# Patient Record
Sex: FEMALE | Race: WHITE
[De-identification: names, ages, dates, MRNs, and addresses within clinical notes are randomized per-mention and may not be internally consistent; named-entity substitution may affect disease eponyms.]

---

## 2017-01-31 ENCOUNTER — HOSPITAL ENCOUNTER (OUTPATIENT)
Dept: HOSPITAL 6 - LAB | Age: 22
End: 2017-01-31
Payer: COMMERCIAL

## 2017-01-31 DIAGNOSIS — Z79.899: ICD-10-CM

## 2017-01-31 DIAGNOSIS — Z51.81: Primary | ICD-10-CM

## 2017-02-09 ENCOUNTER — HOSPITAL ENCOUNTER (OUTPATIENT)
Dept: HOSPITAL 6 - LAB | Age: 22
End: 2017-02-09
Payer: COMMERCIAL

## 2017-02-09 DIAGNOSIS — Z51.81: Primary | ICD-10-CM

## 2017-02-09 DIAGNOSIS — Z79.899: ICD-10-CM

## 2017-02-16 ENCOUNTER — HOSPITAL ENCOUNTER (OUTPATIENT)
Dept: HOSPITAL 6 - LAB | Age: 22
End: 2017-02-16
Payer: COMMERCIAL

## 2017-02-16 DIAGNOSIS — Z79.899: Primary | ICD-10-CM

## 2017-10-03 ENCOUNTER — HOSPITAL ENCOUNTER (EMERGENCY)
Dept: HOSPITAL 6 - ED | Age: 22
LOS: 1 days | Discharge: HOME | End: 2017-10-04
Payer: COMMERCIAL

## 2017-10-03 VITALS — HEIGHT: 67.01 IN | WEIGHT: 210.54 LBS | BODY MASS INDEX: 33.04 KG/M2

## 2017-10-03 DIAGNOSIS — J90: Primary | ICD-10-CM

## 2017-10-03 DIAGNOSIS — N63.20: ICD-10-CM

## 2017-10-03 DIAGNOSIS — R00.0: ICD-10-CM

## 2017-10-03 DIAGNOSIS — M32.9: ICD-10-CM

## 2017-10-04 VITALS
DIASTOLIC BLOOD PRESSURE: 66 MMHG | SYSTOLIC BLOOD PRESSURE: 118 MMHG | DIASTOLIC BLOOD PRESSURE: 66 MMHG | DIASTOLIC BLOOD PRESSURE: 66 MMHG | SYSTOLIC BLOOD PRESSURE: 118 MMHG | SYSTOLIC BLOOD PRESSURE: 118 MMHG

## 2017-10-09 ENCOUNTER — HOSPITAL ENCOUNTER (OUTPATIENT)
Dept: HOSPITAL 6 - RAD | Age: 22
End: 2017-10-09
Attending: NURSE PRACTITIONER
Payer: COMMERCIAL

## 2017-10-09 DIAGNOSIS — N63.23: Primary | ICD-10-CM

## 2017-10-18 ENCOUNTER — HOSPITAL ENCOUNTER (OUTPATIENT)
Dept: HOSPITAL 6 - LAB | Age: 22
End: 2017-10-18
Attending: INTERNAL MEDICINE
Payer: COMMERCIAL

## 2017-10-18 DIAGNOSIS — Z79.899: Primary | ICD-10-CM

## 2017-10-18 LAB
ALBUMIN SERPL-MCNC: 3.4 G/DL (ref 3.5–5)
ALT SERPL-CCNC: 41 U/L (ref 9–52)
AST SERPL-CCNC: 20 U/L (ref 14–36)
ERYTHROCYTE [DISTWIDTH] IN BLOOD BY AUTOMATED COUNT: 13.8 % (ref 11.5–14.5)
HCT VFR BLD AUTO: 39.3 % (ref 37–47)
HGB BLD-MCNC: 12.9 G/DL (ref 12.5–16)
MCH RBC QN AUTO: 29 PG (ref 27–31)
MCHC RBC AUTO-ENTMCNC: 33 G/DL (ref 33–37)
MCV RBC AUTO: 89 FL (ref 78–100)
PLATELET # BLD AUTO: 281 K/MM3 (ref 130–400)
PMV BLD AUTO: 10 FL (ref 7.4–10.4)
RBC # BLD AUTO: 4.42 M/MM3 (ref 4.1–5.3)
WBC # BLD AUTO: 11.7 K/MM3 (ref 4.8–10.8)

## 2018-04-23 ENCOUNTER — HOSPITAL ENCOUNTER (EMERGENCY)
Dept: HOSPITAL 6 - ED | Age: 23
Discharge: HOME | End: 2018-04-23
Payer: COMMERCIAL

## 2018-04-23 VITALS — HEIGHT: 67.99 IN | WEIGHT: 244.93 LBS | BODY MASS INDEX: 37.12 KG/M2

## 2018-04-23 VITALS — SYSTOLIC BLOOD PRESSURE: 124 MMHG | DIASTOLIC BLOOD PRESSURE: 74 MMHG

## 2018-04-23 DIAGNOSIS — M32.9: ICD-10-CM

## 2018-04-23 DIAGNOSIS — E03.9: ICD-10-CM

## 2018-04-23 DIAGNOSIS — M94.0: Primary | ICD-10-CM

## 2018-04-23 DIAGNOSIS — Z32.01: ICD-10-CM

## 2018-04-23 LAB
ALBUMIN SERPL-MCNC: 4 G/DL (ref 3.5–5)
ALT SERPL-CCNC: 41 U/L (ref 9–52)
AST SERPL-CCNC: 40 U/L (ref 14–36)
BASOPHILS # BLD: 0 10*3/UL (ref 0.02–0.1)
BILIRUB SERPL-MCNC: 0.3 MG/DL (ref 0.2–1.3)
BUN/CREAT SERPL: 19 (ref 6–26)
CALCIUM SERPL-MCNC: 8.9 MG/DL (ref 8.4–10.2)
CO2 SERPL-SCNC: 26 MMOL/L (ref 22–30)
D DIMER PPP FEU-MCNC: 0.44 MG/L FEU (ref 0.15–0.5)
EOSINOPHIL # BLD: 0 10*3/UL (ref 0.04–0.4)
EOSINOPHIL NFR BLD: 0.7 % (ref 1–5)
ERYTHROCYTE [DISTWIDTH] IN BLOOD BY AUTOMATED COUNT: 14 % (ref 11.5–14.5)
GLUCOSE SERPL-MCNC: 99 MG/DL (ref 65–105)
HCT VFR BLD AUTO: 37.4 % (ref 37–47)
HGB BLD-MCNC: 12.8 G/DL (ref 12.5–16)
LYMPHOCYTES # BLD: 1.2 10*3/UL (ref 1.5–4)
MCH RBC QN AUTO: 29 PG (ref 27–31)
MCHC RBC AUTO-ENTMCNC: 34 G/DL (ref 33–37)
MCV RBC AUTO: 85 FL (ref 78–100)
MONOCYTES # BLD: 0.5 10*3/UL (ref 0.2–0.8)
NEUTROPHILS # BLD: 4.3 10*3/UL (ref 1.4–6.5)
PLATELET # BLD AUTO: 183 K/MM3 (ref 130–400)
PMV BLD AUTO: 11 FL (ref 7.4–10.4)
POTASSIUM SERPL-SCNC: 3.5 MMOL/L (ref 3.6–5)
PROT SERPL-MCNC: 7.6 G/DL (ref 6.3–8.2)
RBC # BLD AUTO: 4.4 M/MM3 (ref 4.1–5.3)
SODIUM SERPL-SCNC: 139 MMOL/L (ref 137–145)
WBC # BLD AUTO: 6.1 K/MM3 (ref 4.8–10.8)

## 2021-06-19 ENCOUNTER — HOSPITAL ENCOUNTER (OUTPATIENT)
Dept: HOSPITAL 6 - LAB | Age: 26
End: 2021-06-19
Attending: FAMILY MEDICINE
Payer: COMMERCIAL

## 2021-06-19 DIAGNOSIS — Z00.00: Primary | ICD-10-CM

## 2021-06-19 LAB
ALBUMIN SERPL-MCNC: 3.9 G/DL (ref 3.5–5)
ALT SERPL-CCNC: 19 U/L (ref 0–55)
AST SERPL-CCNC: 20 U/L (ref 5–34)
BILIRUB SERPL-MCNC: 0.4 MG/DL (ref 0.2–1.2)
CALCIUM SERPL-MCNC: 8.9 MG/DL (ref 8.3–10.5)
CO2 SERPL-SCNC: 19 MMOL/L (ref 22–29)
GLUCOSE SERPL-MCNC: 82 MG/DL (ref 65–105)
POTASSIUM SERPL-SCNC: 3.9 MMOL/L (ref 3.5–5.1)
PROT SERPL-MCNC: 7.2 G/DL (ref 6.4–8.3)
SODIUM SERPL-SCNC: 141 MMOL/L (ref 136–145)

## 2021-07-04 ENCOUNTER — HOSPITAL ENCOUNTER (EMERGENCY)
Dept: HOSPITAL 6 - ED | Age: 26
Discharge: HOME | End: 2021-07-04
Payer: COMMERCIAL

## 2021-07-04 VITALS — DIASTOLIC BLOOD PRESSURE: 68 MMHG | SYSTOLIC BLOOD PRESSURE: 117 MMHG

## 2021-07-04 DIAGNOSIS — Z79.890: ICD-10-CM

## 2021-07-04 DIAGNOSIS — E03.9: ICD-10-CM

## 2021-07-04 DIAGNOSIS — R07.89: Primary | ICD-10-CM

## 2021-07-04 DIAGNOSIS — Z88.2: ICD-10-CM

## 2021-07-04 LAB
BASOPHILS # BLD: 0.02 10*3/UL (ref 0.02–0.1)
EOSINOPHIL # BLD: 0.11 10*3/UL (ref 0.04–0.4)
EOSINOPHIL NFR BLD: 2 % (ref 1–5)
ERYTHROCYTE [DISTWIDTH] IN BLOOD BY AUTOMATED COUNT: 12.9 % (ref 11.5–14.5)
HCT VFR BLD AUTO: 37.1 % (ref 37–47)
HGB BLD-MCNC: 12 G/DL (ref 12.5–16)
LYMPHOCYTES # BLD: 1.5 10*3/UL (ref 1.5–4)
MCH RBC QN AUTO: 30 PG (ref 27–31)
MCHC RBC AUTO-ENTMCNC: 32 G/DL (ref 33–37)
MCV RBC AUTO: 91 FL (ref 78–100)
MONOCYTES # BLD: 0.46 10*3/UL (ref 0.2–0.8)
NEUTROPHILS # BLD: 3.09 10*3/UL (ref 1.4–6.5)
PLATELET # BLD AUTO: 297 K/MM3 (ref 130–400)
PMV BLD AUTO: 9.7 FL (ref 7.4–10.4)
RBC # BLD AUTO: 4.07 M/MM3 (ref 4.1–5.3)
WBC # BLD AUTO: 5.5 K/MM3 (ref 4.8–10.8)

## 2021-07-17 ENCOUNTER — HOSPITAL ENCOUNTER (EMERGENCY)
Dept: HOSPITAL 6 - ED | Age: 26
Discharge: HOME | End: 2021-07-17
Payer: COMMERCIAL

## 2021-07-17 VITALS — SYSTOLIC BLOOD PRESSURE: 99 MMHG | DIASTOLIC BLOOD PRESSURE: 72 MMHG

## 2021-07-17 DIAGNOSIS — E03.9: ICD-10-CM

## 2021-07-17 DIAGNOSIS — Z79.890: ICD-10-CM

## 2021-07-17 DIAGNOSIS — M94.0: Primary | ICD-10-CM

## 2021-07-17 LAB
D DIMER PPP FEU-MCNC: 0.49 MG/L FEU (ref 0.15–0.5)
ERYTHROCYTE [DISTWIDTH] IN BLOOD BY AUTOMATED COUNT: 13.2 % (ref 11.5–14.5)
HCT VFR BLD AUTO: 37.8 % (ref 37–47)
HGB BLD-MCNC: 12.3 G/DL (ref 12.5–16)
LYMPHOCYTES NFR BLD MANUAL: 44 % (ref 20–51)
MCH RBC QN AUTO: 30 PG (ref 27–31)
MCHC RBC AUTO-ENTMCNC: 33 G/DL (ref 33–37)
MCV RBC AUTO: 91 FL (ref 78–100)
MONOCYTES NFR BLD: 8 % (ref 3–10)
NEUTS SEG NFR BLD MANUAL: 45 % (ref 42–75)
OVALOCYTES BLD QL SMEAR: (no result)
PLATELET # BLD AUTO: 180 K/MM3 (ref 130–400)
PMV BLD AUTO: 11.2 FL (ref 7.4–10.4)
RBC # BLD AUTO: 4.14 M/MM3 (ref 4.1–5.3)
WBC # BLD AUTO: 4.1 K/MM3 (ref 4.8–10.8)

## 2021-10-04 ENCOUNTER — HOSPITAL ENCOUNTER (OUTPATIENT)
Dept: HOSPITAL 6 - CARDLAB | Age: 26
End: 2021-10-04
Payer: COMMERCIAL

## 2021-10-04 DIAGNOSIS — R07.89: Primary | ICD-10-CM

## 2022-01-06 ENCOUNTER — HOSPITAL ENCOUNTER (EMERGENCY)
Dept: HOSPITAL 6 - ED | Age: 27
Discharge: HOME | End: 2022-01-06
Payer: COMMERCIAL

## 2022-01-06 VITALS — DIASTOLIC BLOOD PRESSURE: 82 MMHG | SYSTOLIC BLOOD PRESSURE: 104 MMHG

## 2022-01-06 DIAGNOSIS — R05.9: Primary | ICD-10-CM

## 2022-01-08 ENCOUNTER — HOSPITAL ENCOUNTER (EMERGENCY)
Dept: HOSPITAL 6 - ED | Age: 27
Discharge: HOME | End: 2022-01-08
Payer: COMMERCIAL

## 2022-01-08 VITALS — DIASTOLIC BLOOD PRESSURE: 83 MMHG | SYSTOLIC BLOOD PRESSURE: 125 MMHG

## 2022-01-08 DIAGNOSIS — R05.9: Primary | ICD-10-CM

## 2022-01-08 LAB
BASOPHILS # BLD: 0.02 K/MM3 (ref 0.02–0.1)
EOSINOPHIL # BLD: 0.02 K/MM3 (ref 0.04–0.4)
EOSINOPHIL NFR BLD: 0.4 % (ref 1–5)
ERYTHROCYTE [DISTWIDTH] IN BLOOD BY AUTOMATED COUNT: 12.8 % (ref 11.5–14.5)
ERYTHROCYTE [SEDIMENTATION RATE] IN BLOOD: 83 MM/HR (ref 0–20)
HCT VFR BLD AUTO: 40.6 % (ref 37–47)
HGB BLD-MCNC: 14 G/DL (ref 12.5–16)
LYMPHOCYTES # BLD: 0.81 K/MM3 (ref 1.5–4)
MCH RBC QN AUTO: 31 PG (ref 27–31)
MCHC RBC AUTO-ENTMCNC: 35 G/DL (ref 33–37)
MCV RBC AUTO: 89 FL (ref 78–100)
MONOCYTES # BLD: 0.47 K/MM3 (ref 0.2–0.8)
NEUTROPHILS # BLD: 3.9 K/MM3 (ref 1.4–6.5)
PLATELET # BLD AUTO: 226 K/MM3 (ref 130–400)
PMV BLD AUTO: 10.1 FL (ref 7.4–10.4)
RBC # BLD AUTO: 4.58 M/MM3 (ref 4.1–5.3)
WBC # BLD AUTO: 5.2 K/MM3 (ref 4.8–10.8)

## 2022-01-28 ENCOUNTER — HOSPITAL ENCOUNTER (EMERGENCY)
Dept: HOSPITAL 6 - ED | Age: 27
Discharge: HOME | End: 2022-01-28
Payer: COMMERCIAL

## 2022-01-28 VITALS — BODY MASS INDEX: 36.28 KG/M2 | HEIGHT: 69.02 IN | WEIGHT: 244.93 LBS

## 2022-01-28 VITALS — DIASTOLIC BLOOD PRESSURE: 76 MMHG | SYSTOLIC BLOOD PRESSURE: 122 MMHG

## 2022-01-28 DIAGNOSIS — Z20.822: ICD-10-CM

## 2022-01-28 DIAGNOSIS — M54.2: ICD-10-CM

## 2022-01-28 DIAGNOSIS — Z79.890: ICD-10-CM

## 2022-01-28 DIAGNOSIS — E03.9: ICD-10-CM

## 2022-01-28 DIAGNOSIS — Z98.890: ICD-10-CM

## 2022-01-28 DIAGNOSIS — G43.909: Primary | ICD-10-CM

## 2022-01-28 DIAGNOSIS — M32.9: ICD-10-CM

## 2022-01-28 LAB
ALBUMIN SERPL-MCNC: 4.3 G/DL (ref 3.5–5)
ALT SERPL-CCNC: 123 U/L (ref 0–55)
APPEARANCE UR: CLEAR
AST SERPL-CCNC: 18 U/L (ref 5–34)
BASOPHILS # BLD: 0.02 K/MM3 (ref 0.02–0.1)
BILIRUB SERPL-MCNC: 0.5 MG/DL (ref 0.2–1.2)
BILIRUB UR QL STRIP.AUTO: NEGATIVE
CALCIUM SERPL-MCNC: 9.7 MG/DL (ref 8.3–10.5)
CO2 SERPL-SCNC: 25 MMOL/L (ref 22–29)
COLOR UR AUTO: YELLOW
EOSINOPHIL # BLD: 0.06 K/MM3 (ref 0.04–0.4)
EOSINOPHIL NFR BLD: 0.9 % (ref 1–5)
ERYTHROCYTE [DISTWIDTH] IN BLOOD BY AUTOMATED COUNT: 13.4 % (ref 11.5–14.5)
GLUCOSE SERPL-MCNC: 80 MG/DL (ref 65–105)
GLUCOSE UR QL STRIP.AUTO: NEGATIVE
HCT VFR BLD AUTO: 38.8 % (ref 37–47)
HGB BLD-MCNC: 12.9 G/DL (ref 12.5–16)
KETONES UR QL STRIP.AUTO: NEGATIVE
LEUKOCYTE ESTERASE UR QL STRIP: NEGATIVE
LYMPHOCYTES # BLD: 1.19 K/MM3 (ref 1.5–4)
MCH RBC QN AUTO: 30 PG (ref 27–31)
MCHC RBC AUTO-ENTMCNC: 33 G/DL (ref 33–37)
MCV RBC AUTO: 91 FL (ref 78–100)
MONOCYTES # BLD: 0.53 K/MM3 (ref 0.2–0.8)
MUCOUS THREADS URNS QL MICRO: PRESENT
NEUTROPHILS # BLD: 5.05 K/MM3 (ref 1.4–6.5)
NITRITE UR QL STRIP: NEGATIVE
PH UR STRIP.AUTO: 6 [PH] (ref 5–8)
PLATELET # BLD AUTO: 213 K/MM3 (ref 130–400)
PMV BLD AUTO: 10.1 FL (ref 7.4–10.4)
POTASSIUM SERPL-SCNC: 4 MMOL/L (ref 3.5–5.1)
PROT ?TM UR-MCNC: (no result) MG/DL
PROT SERPL-MCNC: 7.4 G/DL (ref 6.4–8.3)
RBC # BLD AUTO: 4.25 M/MM3 (ref 4.1–5.3)
RBC UR QL AUTO: NEGATIVE
SODIUM SERPL-SCNC: 139 MMOL/L (ref 136–145)
SP GR UR STRIP.AUTO: 1.02 (ref 1–1.03)
UROBILINOGEN UR-MCNC: NORMAL MG/DL
WBC # BLD AUTO: 7 K/MM3 (ref 4.8–10.8)
WBC #/AREA URNS HPF: 0 /HPF (ref 0–3)

## 2022-05-09 ENCOUNTER — HOSPITAL ENCOUNTER (OUTPATIENT)
Dept: HOSPITAL 19 - ZCOL.LAB | Age: 27
End: 2022-05-09
Attending: PHYSICIAN ASSISTANT
Payer: COMMERCIAL

## 2022-05-09 DIAGNOSIS — J32.4: Primary | ICD-10-CM

## 2022-06-03 ENCOUNTER — HOSPITAL ENCOUNTER (EMERGENCY)
Dept: HOSPITAL 19 - COL.ER | Age: 27
LOS: 1 days | Discharge: HOME | End: 2022-06-04
Attending: EMERGENCY MEDICINE
Payer: COMMERCIAL

## 2022-06-03 VITALS — BODY MASS INDEX: 38.55 KG/M2 | HEIGHT: 67 IN | WEIGHT: 245.59 LBS

## 2022-06-03 VITALS — TEMPERATURE: 98.5 F

## 2022-06-03 DIAGNOSIS — Z28.310: ICD-10-CM

## 2022-06-03 DIAGNOSIS — Z87.19: ICD-10-CM

## 2022-06-03 DIAGNOSIS — D72.819: ICD-10-CM

## 2022-06-03 DIAGNOSIS — K80.50: Primary | ICD-10-CM

## 2022-06-03 PROCEDURE — C9113 INJ PANTOPRAZOLE SODIUM, VIA: HCPCS

## 2022-06-04 VITALS — SYSTOLIC BLOOD PRESSURE: 93 MMHG | DIASTOLIC BLOOD PRESSURE: 64 MMHG | HEART RATE: 74 BPM

## 2022-06-04 LAB
ALBUMIN SERPL-MCNC: 3.4 GM/DL (ref 3.5–5)
ALP SERPL-CCNC: 100 U/L (ref 40–150)
ALT SERPL-CCNC: 19 U/L (ref 0–55)
ANION GAP SERPL CALC-SCNC: 14 MMOL/L (ref 7–16)
AST SERPL-CCNC: 49 U/L (ref 5–34)
BILIRUB SERPL-MCNC: 0.9 MG/DL (ref 0.2–1.2)
BUN SERPL-MCNC: 10 MG/DL (ref 7–19)
CALCIUM SERPL-MCNC: 8.7 MG/DL (ref 8.4–10.2)
CHLORIDE SERPL-SCNC: 106 MMOL/L (ref 98–107)
CO2 SERPL-SCNC: 22 MMOL/L (ref 22–29)
CREAT SERPL-SCNC: 0.89 MG/DL (ref 0.57–1.11)
ERYTHROCYTE [DISTWIDTH] IN BLOOD BY AUTOMATED COUNT: 14.9 % (ref 11.5–14.5)
GLUCOSE SERPL-MCNC: 83 MG/DL (ref 70–99)
HCG SERPL QL: NEGATIVE
HCT VFR BLD AUTO: 30.1 % (ref 37–47)
HGB BLD-MCNC: 10 G/DL (ref 12.5–16)
LIPASE SERPL-CCNC: 64 U/L (ref 8–78)
LYMPHOCYTES NFR BLD MANUAL: 6 % (ref 20–51)
MCH RBC QN AUTO: 29 PG (ref 27–31)
MCHC RBC AUTO-ENTMCNC: 33 G/DL (ref 33–37)
MCV RBC AUTO: 87 FL (ref 80–100)
METAMYELOCYTES NFR BLD MANUAL: 1 % (ref 0–0)
MONOCYTES NFR BLD: 7 % (ref 1.7–9.3)
NEUTS BAND NFR BLD: 10 % (ref 0–10)
NEUTS SEG NFR BLD MANUAL: 76 % (ref 42–75.2)
PLATELET # BLD AUTO: 83 K/MM3 (ref 130–400)
PLATELET BLD QL SMEAR: NORMAL
PMV BLD AUTO: 10.2 FL (ref 7.4–10.4)
POTASSIUM SERPL-SCNC: 3.5 MMOL/L (ref 3.5–4.5)
PROT SERPL-MCNC: 6.4 GM/DL (ref 6.2–8.1)
RBC # BLD AUTO: 3.47 M/MM3 (ref 4.1–5.3)
SODIUM SERPL-SCNC: 142 MMOL/L (ref 136–145)

## 2022-06-14 ENCOUNTER — HOSPITAL ENCOUNTER (OUTPATIENT)
Dept: HOSPITAL 6 - LAB | Age: 27
End: 2022-06-14
Attending: FAMILY MEDICINE
Payer: COMMERCIAL

## 2022-06-14 DIAGNOSIS — Z13.220: ICD-10-CM

## 2022-06-14 DIAGNOSIS — F41.8: ICD-10-CM

## 2022-06-14 DIAGNOSIS — Z00.00: Primary | ICD-10-CM

## 2022-06-14 DIAGNOSIS — Z13.1: ICD-10-CM

## 2022-06-14 LAB
ALBUMIN SERPL-MCNC: 3.9 G/DL (ref 3.5–5)
ALT SERPL-CCNC: 10 U/L (ref 0–55)
AST SERPL-CCNC: 13 U/L (ref 5–34)
BILIRUB SERPL-MCNC: 0.5 MG/DL (ref 0.2–1.2)
CALCIUM SERPL-MCNC: 9.4 MG/DL (ref 8.3–10.5)
CO2 SERPL-SCNC: 20 MMOL/L (ref 22–29)
GLUCOSE SERPL-MCNC: 95 MG/DL (ref 65–105)
POTASSIUM SERPL-SCNC: 3.6 MMOL/L (ref 3.5–5.1)
PROT SERPL-MCNC: 6.7 G/DL (ref 6.4–8.3)
SODIUM SERPL-SCNC: 140 MMOL/L (ref 136–145)

## 2022-06-28 ENCOUNTER — HOSPITAL ENCOUNTER (OUTPATIENT)
Dept: HOSPITAL 6 - RAD | Age: 27
End: 2022-06-28
Payer: COMMERCIAL

## 2022-06-28 DIAGNOSIS — K80.20: Primary | ICD-10-CM

## 2022-07-19 ENCOUNTER — HOSPITAL ENCOUNTER (OUTPATIENT)
Dept: HOSPITAL 19 - SDCO | Age: 27
Discharge: HOME | End: 2022-07-19
Attending: SURGERY
Payer: COMMERCIAL

## 2022-07-19 VITALS — BODY MASS INDEX: 37.96 KG/M2 | WEIGHT: 241.85 LBS | HEIGHT: 67 IN

## 2022-07-19 VITALS — DIASTOLIC BLOOD PRESSURE: 63 MMHG | HEART RATE: 75 BPM | SYSTOLIC BLOOD PRESSURE: 117 MMHG

## 2022-07-19 VITALS — SYSTOLIC BLOOD PRESSURE: 101 MMHG | DIASTOLIC BLOOD PRESSURE: 63 MMHG | HEART RATE: 71 BPM

## 2022-07-19 VITALS — SYSTOLIC BLOOD PRESSURE: 124 MMHG | HEART RATE: 64 BPM | DIASTOLIC BLOOD PRESSURE: 65 MMHG

## 2022-07-19 VITALS — DIASTOLIC BLOOD PRESSURE: 60 MMHG | SYSTOLIC BLOOD PRESSURE: 108 MMHG | HEART RATE: 70 BPM | TEMPERATURE: 97.9 F

## 2022-07-19 VITALS — DIASTOLIC BLOOD PRESSURE: 59 MMHG | HEART RATE: 66 BPM | SYSTOLIC BLOOD PRESSURE: 116 MMHG

## 2022-07-19 VITALS — SYSTOLIC BLOOD PRESSURE: 119 MMHG | HEART RATE: 67 BPM | DIASTOLIC BLOOD PRESSURE: 60 MMHG

## 2022-07-19 VITALS — TEMPERATURE: 98.2 F

## 2022-07-19 DIAGNOSIS — Z79.52: ICD-10-CM

## 2022-07-19 DIAGNOSIS — Z79.899: ICD-10-CM

## 2022-07-19 DIAGNOSIS — C71.9: ICD-10-CM

## 2022-07-19 DIAGNOSIS — Z28.9: ICD-10-CM

## 2022-07-19 DIAGNOSIS — Z79.51: ICD-10-CM

## 2022-07-19 DIAGNOSIS — Z28.310: ICD-10-CM

## 2022-07-19 DIAGNOSIS — K80.10: Primary | ICD-10-CM

## 2022-07-19 NOTE — NUR
1600: Patient arrived back into [facility] from PACU. Patient is alert and
oriented. Vital signs stable. Report received from Jana Yarbrough Patient
requesting apple juice and jello. Patient rating pain 3-4/10 to back and
epigastric region. Denies nausea. Call light left within reach.
 
1615: Patient vitally stable. Tolerating food and drink well. Rating pain 5/10
to incisional areas, PRN pain medication given per MAR.
 
1645: Patient up to restroom with stand by assist. States pain is 1/10 to
incisional area. Denies nausea. Patient states she would like to rest.
 
1730: Patient continues to do well. Denies nausea. Rating pain 1-2/10.
 
1755:  Patient meets discharge criteria. IV removed without complications.
Went through discharge instructions with patient and . Patient got
dressed. Escorted to emergency department entrance via wheelchair. Patient got
into personal vehicle unassisted and left in the care of her .

## 2022-07-24 ENCOUNTER — HOSPITAL ENCOUNTER (INPATIENT)
Dept: HOSPITAL 19 - COL.ER | Age: 27
LOS: 5 days | Discharge: HOME | DRG: 392 | End: 2022-07-29
Attending: SURGERY | Admitting: SURGERY
Payer: COMMERCIAL

## 2022-07-24 VITALS — HEART RATE: 41 BPM | SYSTOLIC BLOOD PRESSURE: 129 MMHG | TEMPERATURE: 97.8 F | DIASTOLIC BLOOD PRESSURE: 79 MMHG

## 2022-07-24 VITALS — TEMPERATURE: 97.9 F | HEART RATE: 42 BPM | DIASTOLIC BLOOD PRESSURE: 66 MMHG | SYSTOLIC BLOOD PRESSURE: 109 MMHG

## 2022-07-24 VITALS — BODY MASS INDEX: 37.75 KG/M2 | HEIGHT: 67.01 IN | WEIGHT: 240.52 LBS

## 2022-07-24 VITALS — TEMPERATURE: 98.4 F | HEART RATE: 55 BPM | DIASTOLIC BLOOD PRESSURE: 67 MMHG | SYSTOLIC BLOOD PRESSURE: 123 MMHG

## 2022-07-24 VITALS — TEMPERATURE: 98.4 F | SYSTOLIC BLOOD PRESSURE: 123 MMHG | HEART RATE: 55 BPM | DIASTOLIC BLOOD PRESSURE: 67 MMHG

## 2022-07-24 DIAGNOSIS — E03.9: ICD-10-CM

## 2022-07-24 DIAGNOSIS — F32.A: ICD-10-CM

## 2022-07-24 DIAGNOSIS — K21.9: ICD-10-CM

## 2022-07-24 DIAGNOSIS — F41.9: ICD-10-CM

## 2022-07-24 DIAGNOSIS — M79.7: ICD-10-CM

## 2022-07-24 DIAGNOSIS — Z88.2: ICD-10-CM

## 2022-07-24 DIAGNOSIS — Z79.52: ICD-10-CM

## 2022-07-24 DIAGNOSIS — Z79.890: ICD-10-CM

## 2022-07-24 DIAGNOSIS — C71.9: ICD-10-CM

## 2022-07-24 DIAGNOSIS — K59.00: ICD-10-CM

## 2022-07-24 DIAGNOSIS — R10.13: Primary | ICD-10-CM

## 2022-07-24 DIAGNOSIS — D70.9: ICD-10-CM

## 2022-07-24 DIAGNOSIS — M32.9: ICD-10-CM

## 2022-07-24 DIAGNOSIS — Z23: ICD-10-CM

## 2022-07-24 DIAGNOSIS — Z90.49: ICD-10-CM

## 2022-07-24 DIAGNOSIS — D61.818: ICD-10-CM

## 2022-07-24 DIAGNOSIS — G89.29: ICD-10-CM

## 2022-07-24 DIAGNOSIS — J91.8: ICD-10-CM

## 2022-07-24 LAB
ALBUMIN SERPL-MCNC: 3.6 GM/DL (ref 3.5–5)
ALP SERPL-CCNC: 162 U/L (ref 40–150)
ALT SERPL-CCNC: 258 U/L (ref 0–55)
ANION GAP SERPL CALC-SCNC: 14 MMOL/L (ref 7–16)
AST SERPL-CCNC: 505 U/L (ref 5–34)
BASOPHILS # BLD: 0 K/MM3 (ref 0–0.2)
BASOPHILS NFR BLD AUTO: 0.3 % (ref 0–2)
BILIRUB DIRECT SERPL-MCNC: 1 MG/DL (ref 0–0.5)
BILIRUB SERPL-MCNC: 1.5 MG/DL (ref 0.2–1.2)
BUN SERPL-MCNC: 18 MG/DL (ref 7–19)
CALCIUM SERPL-MCNC: 9.1 MG/DL (ref 8.4–10.2)
CHLORIDE SERPL-SCNC: 108 MMOL/L (ref 98–107)
CO2 SERPL-SCNC: 22 MMOL/L (ref 22–29)
CREAT SERPL-SCNC: 0.71 MG/DL (ref 0.57–1.11)
EOSINOPHIL # BLD: 0 K/MM3 (ref 0–0.7)
EOSINOPHIL NFR BLD: 0.6 % (ref 0–4)
ERYTHROCYTE [DISTWIDTH] IN BLOOD BY AUTOMATED COUNT: 14.3 % (ref 11.5–14.5)
GLUCOSE SERPL-MCNC: 90 MG/DL (ref 70–99)
GRANULOCYTES # BLD AUTO: 85.8 % (ref 42.2–75.2)
HCG SERPL QL: NEGATIVE
HCT VFR BLD AUTO: 31.3 % (ref 37–47)
HGB BLD-MCNC: 10.3 G/DL (ref 12.5–16)
LIPASE SERPL-CCNC: 37 U/L (ref 8–78)
LYMPHOCYTES # BLD: 0.1 K/MM3 (ref 1.2–3.4)
LYMPHOCYTES NFR BLD: 3.1 % (ref 20–51)
MCH RBC QN AUTO: 29 PG (ref 27–31)
MCHC RBC AUTO-ENTMCNC: 33 G/DL (ref 33–37)
MCV RBC AUTO: 87 FL (ref 80–100)
MONOCYTES # BLD: 0.3 K/MM3 (ref 0.1–0.6)
MONOCYTES NFR BLD AUTO: 9 % (ref 1.7–9.3)
NEUTROPHILS # BLD: 2.8 K/MM3 (ref 1.4–6.5)
PLATELET # BLD AUTO: 137 K/MM3 (ref 130–400)
PMV BLD AUTO: 10.3 FL (ref 7.4–10.4)
POTASSIUM SERPL-SCNC: 3.6 MMOL/L (ref 3.5–4.5)
PROT SERPL-MCNC: 6.7 GM/DL (ref 6.2–8.1)
RBC # BLD AUTO: 3.58 M/MM3 (ref 4.1–5.3)
SODIUM SERPL-SCNC: 144 MMOL/L (ref 136–145)

## 2022-07-24 PROCEDURE — C9113 INJ PANTOPRAZOLE SODIUM, VIA: HCPCS

## 2022-07-24 NOTE — NUR
PT UP TO FLOOR FROM ER AT 1745. PT STATES THAT SHE IS NOT HAVING ANY ABD PAIN
AT THIS TIME. "I WAS EARLIER BUT I FEEL OK NOW." PT STATES THAT SHE IS ON
CHEMO BUT HAS NOT HAD IT FOR ABOUT 2 MONTHS NOW. PT STATES THAT SHE WOULD LIKE
TO HAVE SOME WATER. ITEMS WERE GIVEN TO PT. PT STATES NO OTHER COCNERNS/NEEDS
AT THIS TIME. CALL LIGHT IS WITHIN REACH.

## 2022-07-25 VITALS — SYSTOLIC BLOOD PRESSURE: 117 MMHG | TEMPERATURE: 97.9 F | DIASTOLIC BLOOD PRESSURE: 72 MMHG | HEART RATE: 64 BPM

## 2022-07-25 VITALS — HEART RATE: 46 BPM | SYSTOLIC BLOOD PRESSURE: 121 MMHG | TEMPERATURE: 97.8 F | DIASTOLIC BLOOD PRESSURE: 80 MMHG

## 2022-07-25 VITALS — TEMPERATURE: 98.1 F | HEART RATE: 66 BPM | SYSTOLIC BLOOD PRESSURE: 115 MMHG | DIASTOLIC BLOOD PRESSURE: 73 MMHG

## 2022-07-25 VITALS — DIASTOLIC BLOOD PRESSURE: 78 MMHG | SYSTOLIC BLOOD PRESSURE: 120 MMHG | TEMPERATURE: 98.2 F | HEART RATE: 56 BPM

## 2022-07-25 VITALS — HEART RATE: 49 BPM | DIASTOLIC BLOOD PRESSURE: 61 MMHG | TEMPERATURE: 97.9 F | SYSTOLIC BLOOD PRESSURE: 110 MMHG

## 2022-07-25 VITALS — HEART RATE: 52 BPM | TEMPERATURE: 98 F | DIASTOLIC BLOOD PRESSURE: 58 MMHG | SYSTOLIC BLOOD PRESSURE: 100 MMHG

## 2022-07-25 LAB
ALBUMIN SERPL-MCNC: 3.1 GM/DL (ref 3.5–5)
ALP SERPL-CCNC: 162 U/L (ref 40–150)
ALT SERPL-CCNC: 260 U/L (ref 0–55)
ANION GAP SERPL CALC-SCNC: 11 MMOL/L (ref 7–16)
ANISOCYTOSIS BLD QL: (no result)
AST SERPL-CCNC: 275 U/L (ref 5–34)
BILIRUB SERPL-MCNC: 1.7 MG/DL (ref 0.2–1.2)
BUN SERPL-MCNC: 10 MG/DL (ref 7–19)
CALCIUM SERPL-MCNC: 8.6 MG/DL (ref 8.4–10.2)
CHLORIDE SERPL-SCNC: 109 MMOL/L (ref 98–107)
CO2 SERPL-SCNC: 22 MMOL/L (ref 22–29)
CREAT SERPL-SCNC: 0.68 MG/DL (ref 0.57–1.11)
EOSINOPHIL NFR BLD: 2 % (ref 0–4)
ERYTHROCYTE [DISTWIDTH] IN BLOOD BY AUTOMATED COUNT: 14.3 % (ref 11.5–14.5)
GLUCOSE SERPL-MCNC: 85 MG/DL (ref 70–99)
HCT VFR BLD AUTO: 28.5 % (ref 37–47)
HGB BLD-MCNC: 9.2 G/DL (ref 12.5–16)
HYPOCHROMIA BLD QL SMEAR: (no result)
LIPASE SERPL-CCNC: 22 U/L (ref 8–78)
LYMPHOCYTES NFR BLD MANUAL: 8 % (ref 20–51)
MCH RBC QN AUTO: 29 PG (ref 27–31)
MCHC RBC AUTO-ENTMCNC: 32 G/DL (ref 33–37)
MCV RBC AUTO: 90 FL (ref 80–100)
MONOCYTES NFR BLD: 6 % (ref 1.7–9.3)
NEUTS BAND NFR BLD: 4 % (ref 0–10)
NEUTS SEG NFR BLD MANUAL: 80 % (ref 42–75.2)
PLATELET # BLD AUTO: 93 K/MM3 (ref 130–400)
PLATELET BLD QL SMEAR: (no result)
PMV BLD AUTO: 10.9 FL (ref 7.4–10.4)
POTASSIUM SERPL-SCNC: 4 MMOL/L (ref 3.5–4.5)
PROT SERPL-MCNC: 5.8 GM/DL (ref 6.2–8.1)
RBC # BLD AUTO: 3.18 M/MM3 (ref 4.1–5.3)
SODIUM SERPL-SCNC: 142 MMOL/L (ref 136–145)

## 2022-07-25 NOTE — NUR
Initial visit; Patient was receptive to prayer for her ill health. 
prayed that she be healed of her many illnesses and will keep her in
's prayers.

## 2022-07-25 NOTE — NUR
Pt had uneventful night. Some nausea, Zofran given per MAR. Pt's IV in the
left hand was painful and swollen at insertion site. Started new IV in the
left forearm, 20g. Patient denies any pain from that site. Fluids running. Pt
assesment completed. No other concerns. Report given to SELIN Odom.

## 2022-07-26 VITALS — SYSTOLIC BLOOD PRESSURE: 103 MMHG | TEMPERATURE: 98.1 F | DIASTOLIC BLOOD PRESSURE: 51 MMHG | HEART RATE: 60 BPM

## 2022-07-26 VITALS — SYSTOLIC BLOOD PRESSURE: 112 MMHG | HEART RATE: 68 BPM | DIASTOLIC BLOOD PRESSURE: 62 MMHG | TEMPERATURE: 98.2 F

## 2022-07-26 VITALS — DIASTOLIC BLOOD PRESSURE: 85 MMHG | HEART RATE: 58 BPM | SYSTOLIC BLOOD PRESSURE: 117 MMHG | TEMPERATURE: 97.8 F

## 2022-07-26 VITALS — HEART RATE: 67 BPM | TEMPERATURE: 97.6 F | DIASTOLIC BLOOD PRESSURE: 61 MMHG | SYSTOLIC BLOOD PRESSURE: 111 MMHG

## 2022-07-26 VITALS — SYSTOLIC BLOOD PRESSURE: 110 MMHG | TEMPERATURE: 97 F | DIASTOLIC BLOOD PRESSURE: 56 MMHG | HEART RATE: 56 BPM

## 2022-07-26 LAB
ALBUMIN SERPL-MCNC: 3.1 GM/DL (ref 3.5–5)
ALP SERPL-CCNC: 170 U/L (ref 40–150)
ALT SERPL-CCNC: 225 U/L (ref 0–55)
ANION GAP SERPL CALC-SCNC: 12 MMOL/L (ref 7–16)
AST SERPL-CCNC: 185 U/L (ref 5–34)
BASOPHILS # BLD: 0 K/MM3 (ref 0–0.2)
BASOPHILS NFR BLD AUTO: 0.3 % (ref 0–2)
BILIRUB SERPL-MCNC: 1.5 MG/DL (ref 0.2–1.2)
BUN SERPL-MCNC: 7 MG/DL (ref 7–19)
CALCIUM SERPL-MCNC: 8.9 MG/DL (ref 8.4–10.2)
CHLORIDE SERPL-SCNC: 109 MMOL/L (ref 98–107)
CO2 SERPL-SCNC: 23 MMOL/L (ref 22–29)
CREAT SERPL-SCNC: 0.74 MG/DL (ref 0.57–1.11)
EOSINOPHIL # BLD: 0 K/MM3 (ref 0–0.7)
EOSINOPHIL NFR BLD: 1 % (ref 0–4)
ERYTHROCYTE [DISTWIDTH] IN BLOOD BY AUTOMATED COUNT: 14.5 % (ref 11.5–14.5)
GLUCOSE SERPL-MCNC: 92 MG/DL (ref 70–99)
GRANULOCYTES # BLD AUTO: 80.3 % (ref 42.2–75.2)
HCT VFR BLD AUTO: 28.9 % (ref 37–47)
HGB BLD-MCNC: 9.2 G/DL (ref 12.5–16)
LYMPHOCYTES # BLD: 0.2 K/MM3 (ref 1.2–3.4)
LYMPHOCYTES NFR BLD: 6.5 % (ref 20–51)
MCH RBC QN AUTO: 29 PG (ref 27–31)
MCHC RBC AUTO-ENTMCNC: 32 G/DL (ref 33–37)
MCV RBC AUTO: 91 FL (ref 80–100)
MONOCYTES # BLD: 0.3 K/MM3 (ref 0.1–0.6)
MONOCYTES NFR BLD AUTO: 10.5 % (ref 1.7–9.3)
NEUTROPHILS # BLD: 2.4 K/MM3 (ref 1.4–6.5)
PLATELET # BLD AUTO: 126 K/MM3 (ref 130–400)
PMV BLD AUTO: 11 FL (ref 7.4–10.4)
POTASSIUM SERPL-SCNC: 3.8 MMOL/L (ref 3.5–4.5)
PROT SERPL-MCNC: 5.8 GM/DL (ref 6.2–8.1)
RBC # BLD AUTO: 3.17 M/MM3 (ref 4.1–5.3)
SODIUM SERPL-SCNC: 144 MMOL/L (ref 136–145)

## 2022-07-26 NOTE — NUR
THIS PATIENT HAD A DIFFICULT NIGHT WITH PAIN AND NAUSEA. THE PATIENT DID
RECEIVE TWO DOSES OF DILAUDID BACK TO BACK PER THE MAR PROTOCOL. THE PATIENT
IS COMPLAINING OF PAIN DIRECTLY BELOW THE XYPHOID PROCESS. THE ABD CT SCAN
DOES SHOW SOME TRACE BILATERAL PLEURAL EFFUSIONS AS WELL AS A MASS IN THE LEFT
BREAST THAT WAS DISCUSSED WITH BJ SHARIF THIS MORNING. REPORT GIVEN TO
SELIN VELEZ. NO OTHER CONCERNS.

## 2022-07-26 NOTE — NUR
met with patient to discuss discharge planning. Patient lives in
[place] with her , Theo (ph#[phone]) and their two sons, ages 3
and 1. Patient sees Dr. Roque for primary care and follows at [facility] for
oncology. Patient had surgery for brain cancer in January and has had
chemotherapy. Patient states she was just recently cleared to drive again as
she could not drive after her brain surgery. Patient is employed by [employer] and is independent with [employer]. Patient does not use any DME.
Patient was interested in completing [facility]. Patient chose to designate her
, Teho and her mother, Ashley (ph#[phone]). SW provided original
and copies to patient then placed copy in patient's chart. Patient plans to
return home at time of discharge.
 
Discharge Plan: Home

## 2022-07-27 VITALS — SYSTOLIC BLOOD PRESSURE: 106 MMHG | HEART RATE: 66 BPM | DIASTOLIC BLOOD PRESSURE: 59 MMHG | TEMPERATURE: 98.2 F

## 2022-07-27 VITALS — HEART RATE: 62 BPM | TEMPERATURE: 98.4 F | SYSTOLIC BLOOD PRESSURE: 110 MMHG | DIASTOLIC BLOOD PRESSURE: 60 MMHG

## 2022-07-27 VITALS — SYSTOLIC BLOOD PRESSURE: 109 MMHG | DIASTOLIC BLOOD PRESSURE: 53 MMHG | HEART RATE: 59 BPM

## 2022-07-27 VITALS — HEART RATE: 62 BPM | TEMPERATURE: 98.1 F | DIASTOLIC BLOOD PRESSURE: 60 MMHG | SYSTOLIC BLOOD PRESSURE: 110 MMHG

## 2022-07-27 VITALS — TEMPERATURE: 98.2 F | DIASTOLIC BLOOD PRESSURE: 64 MMHG | SYSTOLIC BLOOD PRESSURE: 124 MMHG | HEART RATE: 69 BPM

## 2022-07-27 LAB
ANION GAP SERPL CALC-SCNC: 10 MMOL/L (ref 7–16)
BUN SERPL-MCNC: 6 MG/DL (ref 7–19)
CALCIUM SERPL-MCNC: 9.1 MG/DL (ref 8.4–10.2)
CHLORIDE SERPL-SCNC: 106 MMOL/L (ref 98–107)
CO2 SERPL-SCNC: 23 MMOL/L (ref 22–29)
CREAT SERPL-SCNC: 0.79 MG/DL (ref 0.57–1.11)
EOSINOPHIL NFR BLD: 2 % (ref 0–4)
ERYTHROCYTE [DISTWIDTH] IN BLOOD BY AUTOMATED COUNT: 14.9 % (ref 11.5–14.5)
GLUCOSE SERPL-MCNC: 85 MG/DL (ref 70–99)
HCT VFR BLD AUTO: 31.7 % (ref 37–47)
HGB BLD-MCNC: 10.2 G/DL (ref 12.5–16)
LYMPHOCYTES NFR BLD MANUAL: 10 % (ref 20–51)
MCH RBC QN AUTO: 29 PG (ref 27–31)
MCHC RBC AUTO-ENTMCNC: 32 G/DL (ref 33–37)
MCV RBC AUTO: 90 FL (ref 80–100)
METAMYELOCYTES NFR BLD MANUAL: 1 % (ref 0–0)
MONOCYTES NFR BLD: 5 % (ref 1.7–9.3)
NEUTS BAND NFR BLD: 4 % (ref 0–10)
NEUTS SEG NFR BLD MANUAL: 78 % (ref 42–75.2)
PLATELET # BLD AUTO: 100 K/MM3 (ref 130–400)
PLATELET BLD QL SMEAR: (no result)
PMV BLD AUTO: 11.5 FL (ref 7.4–10.4)
POTASSIUM SERPL-SCNC: 3.6 MMOL/L (ref 3.5–4.5)
RBC # BLD AUTO: 3.51 M/MM3 (ref 4.1–5.3)
SODIUM SERPL-SCNC: 139 MMOL/L (ref 136–145)

## 2022-07-27 PROCEDURE — 0DB88ZX EXCISION OF SMALL INTESTINE, VIA NATURAL OR ARTIFICIAL OPENING ENDOSCOPIC, DIAGNOSTIC: ICD-10-PCS | Performed by: INTERNAL MEDICINE

## 2022-07-27 PROCEDURE — 0DB68ZX EXCISION OF STOMACH, VIA NATURAL OR ARTIFICIAL OPENING ENDOSCOPIC, DIAGNOSTIC: ICD-10-PCS | Performed by: INTERNAL MEDICINE

## 2022-07-27 PROCEDURE — 0DB98ZX EXCISION OF DUODENUM, VIA NATURAL OR ARTIFICIAL OPENING ENDOSCOPIC, DIAGNOSTIC: ICD-10-PCS | Performed by: INTERNAL MEDICINE

## 2022-07-27 NOTE — NUR
PT RESTING IN BED AT THIS TIME. MORNING MEDICATIONS GIVEN. SHIFT ASSESSMENT
COMPLETED. PT TOLERATING CLEAR LIQUIDS AFTER NAUSEOUS EPISODE THIS AM. WILL BE
NPO AT 1100 FOR EGD THIS AFTERNOON. PT REPORTING EPIGASTRIC PAIN. DENIES ANY
NEEDS. WILL CONTINUE TO MONITOR.

## 2022-07-28 VITALS — HEART RATE: 68 BPM | TEMPERATURE: 99 F | SYSTOLIC BLOOD PRESSURE: 112 MMHG | DIASTOLIC BLOOD PRESSURE: 51 MMHG

## 2022-07-28 VITALS — HEART RATE: 74 BPM | DIASTOLIC BLOOD PRESSURE: 34 MMHG | TEMPERATURE: 97.5 F | SYSTOLIC BLOOD PRESSURE: 93 MMHG

## 2022-07-28 VITALS — TEMPERATURE: 98 F | DIASTOLIC BLOOD PRESSURE: 66 MMHG | SYSTOLIC BLOOD PRESSURE: 118 MMHG | HEART RATE: 83 BPM

## 2022-07-28 VITALS — HEART RATE: 73 BPM | DIASTOLIC BLOOD PRESSURE: 60 MMHG | SYSTOLIC BLOOD PRESSURE: 113 MMHG | TEMPERATURE: 98.5 F

## 2022-07-28 VITALS — TEMPERATURE: 97.9 F | SYSTOLIC BLOOD PRESSURE: 112 MMHG | HEART RATE: 84 BPM | DIASTOLIC BLOOD PRESSURE: 56 MMHG

## 2022-07-28 VITALS — SYSTOLIC BLOOD PRESSURE: 110 MMHG | HEART RATE: 65 BPM | TEMPERATURE: 97.6 F | DIASTOLIC BLOOD PRESSURE: 62 MMHG

## 2022-07-28 VITALS — SYSTOLIC BLOOD PRESSURE: 94 MMHG | HEART RATE: 75 BPM | DIASTOLIC BLOOD PRESSURE: 34 MMHG

## 2022-07-28 LAB
ANION GAP SERPL CALC-SCNC: 12 MMOL/L (ref 7–16)
BASOPHILS NFR BLD MANUAL: 1 % (ref 0–2)
BUN SERPL-MCNC: 6 MG/DL (ref 7–19)
CALCIUM SERPL-MCNC: 9.2 MG/DL (ref 8.4–10.2)
CHLORIDE SERPL-SCNC: 107 MMOL/L (ref 98–107)
CO2 SERPL-SCNC: 20 MMOL/L (ref 22–29)
CREAT SERPL-SCNC: 0.78 MG/DL (ref 0.57–1.11)
ERYTHROCYTE [DISTWIDTH] IN BLOOD BY AUTOMATED COUNT: 14.6 % (ref 11.5–14.5)
GLUCOSE SERPL-MCNC: 88 MG/DL (ref 70–99)
HCT VFR BLD AUTO: 33.1 % (ref 37–47)
HGB BLD-MCNC: 10.5 G/DL (ref 12.5–16)
LYMPHOCYTES NFR BLD MANUAL: 6 % (ref 20–51)
MCH RBC QN AUTO: 29 PG (ref 27–31)
MCHC RBC AUTO-ENTMCNC: 32 G/DL (ref 33–37)
MCV RBC AUTO: 90 FL (ref 80–100)
MONOCYTES NFR BLD: 6 % (ref 1.7–9.3)
NEUTS BAND NFR BLD: 7 % (ref 0–10)
NEUTS SEG NFR BLD MANUAL: 80 % (ref 42–75.2)
PLATELET # BLD AUTO: 165 K/MM3 (ref 130–400)
PLATELET BLD QL SMEAR: NORMAL
PMV BLD AUTO: 10.8 FL (ref 7.4–10.4)
POTASSIUM SERPL-SCNC: 4 MMOL/L (ref 3.5–4.5)
RBC # BLD AUTO: 3.66 M/MM3 (ref 4.1–5.3)
SODIUM SERPL-SCNC: 139 MMOL/L (ref 136–145)

## 2022-07-28 NOTE — NUR
PT RESTING IN BED. MORNING MEDICATIONS GIVEN. SHIFT ASSESSMENT COMPLETED. PT
REPORT PAIN TO BACK THAT RADIATES TO THE CHEST AND EPIGASTRIC AREA, MEDICATION
GIVEN PER eMAR. DENIES ANY OTHER NEEDS AT THIS TIME. WILL CONTINUE TO MONITOR.

## 2022-07-29 VITALS — DIASTOLIC BLOOD PRESSURE: 63 MMHG | SYSTOLIC BLOOD PRESSURE: 102 MMHG | TEMPERATURE: 97.6 F | HEART RATE: 72 BPM

## 2022-07-29 VITALS — SYSTOLIC BLOOD PRESSURE: 102 MMHG | DIASTOLIC BLOOD PRESSURE: 56 MMHG | HEART RATE: 55 BPM | TEMPERATURE: 98.1 F

## 2022-07-29 VITALS — HEART RATE: 59 BPM | DIASTOLIC BLOOD PRESSURE: 50 MMHG | TEMPERATURE: 97.7 F | SYSTOLIC BLOOD PRESSURE: 102 MMHG

## 2022-07-29 LAB
ANION GAP SERPL CALC-SCNC: 13 MMOL/L (ref 7–16)
BUN SERPL-MCNC: 10 MG/DL (ref 7–19)
CALCIUM SERPL-MCNC: 9.4 MG/DL (ref 8.4–10.2)
CHLORIDE SERPL-SCNC: 106 MMOL/L (ref 98–107)
CO2 SERPL-SCNC: 19 MMOL/L (ref 22–29)
CREAT SERPL-SCNC: 0.79 MG/DL (ref 0.57–1.11)
EOSINOPHIL NFR BLD: 1 % (ref 0–4)
ERYTHROCYTE [DISTWIDTH] IN BLOOD BY AUTOMATED COUNT: 14.5 % (ref 11.5–14.5)
GLUCOSE SERPL-MCNC: 82 MG/DL (ref 70–99)
HCT VFR BLD AUTO: 34 % (ref 37–47)
HGB BLD-MCNC: 10.7 G/DL (ref 12.5–16)
LYMPHOCYTES NFR BLD MANUAL: 9 % (ref 20–51)
MCH RBC QN AUTO: 29 PG (ref 27–31)
MCHC RBC AUTO-ENTMCNC: 32 G/DL (ref 33–37)
MCV RBC AUTO: 91 FL (ref 80–100)
MICROCYTES BLD QL SMEAR: (no result)
MONOCYTES NFR BLD: 6 % (ref 1.7–9.3)
NEUTS BAND NFR BLD: 8 % (ref 0–10)
NEUTS SEG NFR BLD MANUAL: 76 % (ref 42–75.2)
PLATELET # BLD AUTO: 160 K/MM3 (ref 130–400)
PLATELET BLD QL SMEAR: NORMAL
PMV BLD AUTO: 11.5 FL (ref 7.4–10.4)
POTASSIUM SERPL-SCNC: 4.5 MMOL/L (ref 3.5–4.5)
RBC # BLD AUTO: 3.75 M/MM3 (ref 4.1–5.3)
SODIUM SERPL-SCNC: 138 MMOL/L (ref 136–145)

## 2022-07-29 NOTE — NUR
PT VSS, ORIENT AND ALERT X4, DENIES ANY COMPLAINTS, DUE MEDICATION GIVEN NO
ADVERSE REACTION NOTED. PT DISCHARGED TO GO HOME TODAY, IV REMOVED, TIP
INTACT.DISCHARGE SUMMARY PROVIDED. DISCHARGE EDUCATION PROVIDED PT VERBALIZE
UNDERSTANDING.ESCORTED OUT OF THE BUILDING BY [facility] TECH.

## 2022-07-29 NOTE — NUR
ASSESSMENT COMPLETE FOR NIGHT SHIFT. PT SITTING UP IN BED TALKING TO HER
SPOUSE ON THE PHONE. PT HAD SOME MILD EPIGASTRIC PAIN. PT GIVEN SCHEDULED
TYLENOL FOR PAIN. PT FELT THE TYLENOL WAS EFFECTIVE. PT DENIED CHEST PAIN,
PALPITATIONS, N,V,D, SOB OR DIZZINESS. PT WAS ABLE TO HAVE A COUPLE OF BOWEL
MOVEMENTS THIS SHIFT. PT EXPRESSED NO ADDITIONAL NEEDS AT THIS TIME. CALL
LIGHT WITHIN REACH.

## 2022-08-08 ENCOUNTER — HOSPITAL ENCOUNTER (OUTPATIENT)
Dept: HOSPITAL 6 - LAB | Age: 27
End: 2022-08-08
Attending: NURSE PRACTITIONER
Payer: COMMERCIAL

## 2022-08-08 DIAGNOSIS — Z20.822: ICD-10-CM

## 2022-08-08 DIAGNOSIS — J02.9: Primary | ICD-10-CM

## 2022-08-10 ENCOUNTER — HOSPITAL ENCOUNTER (OUTPATIENT)
Dept: HOSPITAL 6 - LAB | Age: 27
End: 2022-08-10
Attending: NURSE PRACTITIONER
Payer: COMMERCIAL

## 2022-08-10 DIAGNOSIS — Z20.822: Primary | ICD-10-CM

## 2022-09-16 ENCOUNTER — HOSPITAL ENCOUNTER (OUTPATIENT)
Dept: HOSPITAL 6 - RAD | Age: 27
End: 2022-09-16
Attending: FAMILY MEDICINE
Payer: COMMERCIAL

## 2022-09-16 DIAGNOSIS — M54.6: Primary | ICD-10-CM

## 2022-09-16 DIAGNOSIS — M89.8X1: ICD-10-CM

## 2022-09-17 ENCOUNTER — HOSPITAL ENCOUNTER (EMERGENCY)
Dept: HOSPITAL 19 - COL.ER | Age: 27
Discharge: HOME | End: 2022-09-17
Attending: EMERGENCY MEDICINE
Payer: COMMERCIAL

## 2022-09-17 VITALS — TEMPERATURE: 97.8 F

## 2022-09-17 VITALS — WEIGHT: 240.52 LBS | BODY MASS INDEX: 37.75 KG/M2 | HEIGHT: 67.01 IN

## 2022-09-17 VITALS — HEART RATE: 86 BPM | SYSTOLIC BLOOD PRESSURE: 117 MMHG | DIASTOLIC BLOOD PRESSURE: 17 MMHG

## 2022-09-17 DIAGNOSIS — Z88.2: ICD-10-CM

## 2022-09-17 DIAGNOSIS — Z28.310: ICD-10-CM

## 2022-09-17 DIAGNOSIS — J18.9: Primary | ICD-10-CM

## 2022-09-17 LAB
ALBUMIN SERPL-MCNC: 3.8 GM/DL (ref 3.5–5)
ALP SERPL-CCNC: 82 U/L (ref 40–150)
ALT SERPL-CCNC: 11 U/L (ref 0–55)
ANION GAP SERPL CALC-SCNC: 11 MMOL/L (ref 7–16)
ANISOCYTOSIS BLD QL: (no result)
AST SERPL-CCNC: 15 U/L (ref 5–34)
BILIRUB SERPL-MCNC: 0.4 MG/DL (ref 0.2–1.2)
BUN SERPL-MCNC: 11 MG/DL (ref 7–19)
CALCIUM SERPL-MCNC: 9.1 MG/DL (ref 8.4–10.2)
CHLORIDE SERPL-SCNC: 107 MMOL/L (ref 98–107)
CO2 SERPL-SCNC: 21 MMOL/L (ref 22–29)
CREAT SERPL-SCNC: 0.74 MG/DL (ref 0.57–1.11)
ERYTHROCYTE [DISTWIDTH] IN BLOOD BY AUTOMATED COUNT: 16.1 % (ref 11.5–14.5)
GLUCOSE SERPL-MCNC: 91 MG/DL (ref 70–99)
HCG SERPL QL: NEGATIVE
HCT VFR BLD AUTO: 32.6 % (ref 37–47)
HGB BLD-MCNC: 11.1 G/DL (ref 12.5–16)
LYMPHOCYTES NFR BLD MANUAL: 5 % (ref 20–51)
MCH RBC QN AUTO: 30 PG (ref 27–31)
MCHC RBC AUTO-ENTMCNC: 34 G/DL (ref 33–37)
MCV RBC AUTO: 89 FL (ref 80–100)
MONOCYTES NFR BLD: 3 % (ref 1.7–9.3)
NEUTS SEG NFR BLD MANUAL: 92 % (ref 42–75.2)
PLATELET # BLD AUTO: 119 K/MM3 (ref 130–400)
PLATELET BLD QL SMEAR: (no result)
PMV BLD AUTO: 9.7 FL (ref 7.4–10.4)
POTASSIUM SERPL-SCNC: 4.6 MMOL/L (ref 3.5–4.5)
PROT SERPL-MCNC: 7.3 GM/DL (ref 6.2–8.1)
RBC # BLD AUTO: 3.65 M/MM3 (ref 4.1–5.3)
SODIUM SERPL-SCNC: 139 MMOL/L (ref 136–145)

## 2022-09-23 ENCOUNTER — HOSPITAL ENCOUNTER (OUTPATIENT)
Dept: HOSPITAL 6 - RAD | Age: 27
End: 2022-09-23
Attending: FAMILY MEDICINE
Payer: COMMERCIAL

## 2022-09-23 DIAGNOSIS — Z87.01: ICD-10-CM

## 2022-09-23 DIAGNOSIS — J98.11: Primary | ICD-10-CM

## 2022-11-08 ENCOUNTER — HOSPITAL ENCOUNTER (OUTPATIENT)
Dept: HOSPITAL 19 - COL.RAD | Age: 27
End: 2022-11-08
Payer: COMMERCIAL

## 2022-11-08 DIAGNOSIS — Z98.890: ICD-10-CM

## 2022-11-08 DIAGNOSIS — C71.9: Primary | ICD-10-CM

## 2022-11-08 PROCEDURE — A9575 INJ GADOTERATE MEGLUMI 0.1ML: HCPCS

## 2023-03-17 ENCOUNTER — HOSPITAL ENCOUNTER (OUTPATIENT)
Dept: HOSPITAL 6 - RAD | Age: 28
End: 2023-03-17
Attending: FAMILY MEDICINE
Payer: COMMERCIAL

## 2023-03-17 DIAGNOSIS — C71.9: Primary | ICD-10-CM

## 2023-03-17 DIAGNOSIS — Z98.890: ICD-10-CM

## 2023-03-17 DIAGNOSIS — R00.0: ICD-10-CM

## 2023-03-17 DIAGNOSIS — B07.9: ICD-10-CM

## 2023-03-17 LAB
CALCIUM SERPL-MCNC: 9.3 MG/DL (ref 8.3–10.5)
CO2 SERPL-SCNC: 24 MMOL/L (ref 22–29)
GLUCOSE SERPL-MCNC: 77 MG/DL (ref 65–105)
POTASSIUM SERPL-SCNC: 3.9 MMOL/L (ref 3.5–5.1)
SODIUM SERPL-SCNC: 141 MMOL/L (ref 136–145)

## 2023-10-14 ENCOUNTER — HOSPITAL ENCOUNTER (EMERGENCY)
Dept: HOSPITAL 6 - ED | Age: 28
Discharge: HOME | End: 2023-10-14
Payer: COMMERCIAL

## 2023-10-14 VITALS — WEIGHT: 244.93 LBS | HEIGHT: 67.99 IN | BODY MASS INDEX: 37.12 KG/M2

## 2023-10-14 VITALS — SYSTOLIC BLOOD PRESSURE: 116 MMHG | DIASTOLIC BLOOD PRESSURE: 76 MMHG

## 2023-10-14 DIAGNOSIS — N39.0: Primary | ICD-10-CM

## 2023-10-14 DIAGNOSIS — Z28.310: ICD-10-CM

## 2023-10-14 DIAGNOSIS — B37.9: ICD-10-CM

## 2023-10-14 DIAGNOSIS — Z79.899: ICD-10-CM

## 2023-10-14 DIAGNOSIS — C71.9: ICD-10-CM

## 2023-10-14 DIAGNOSIS — Z88.2: ICD-10-CM

## 2023-10-14 LAB
ALBUMIN SERPL-MCNC: 4.6 G/DL (ref 3.5–5)
ALT SERPL-CCNC: 18 U/L (ref 0–55)
APPEARANCE UR: CLEAR
AST SERPL-CCNC: 19 U/L (ref 5–34)
BILIRUB SERPL-MCNC: 1 MG/DL (ref 0.2–1.2)
BILIRUB UR QL STRIP.AUTO: NEGATIVE
CALCIUM SERPL-MCNC: 10 MG/DL (ref 8.3–10.5)
CO2 SERPL-SCNC: 20 MMOL/L (ref 22–29)
COLOR UR AUTO: YELLOW
ERYTHROCYTE [DISTWIDTH] IN BLOOD BY AUTOMATED COUNT: 13.8 % (ref 11.5–14.5)
GLUCOSE SERPL-MCNC: 102 MG/DL (ref 65–105)
GLUCOSE UR QL STRIP.AUTO: NEGATIVE
HCT VFR BLD AUTO: 42 % (ref 37–47)
HGB BLD-MCNC: 13.7 G/DL (ref 12.5–16)
KETONES UR QL STRIP.AUTO: NEGATIVE
LEUKOCYTE ESTERASE UR QL STRIP: (no result)
LYMPHOCYTES NFR BLD MANUAL: 10 % (ref 20–51)
MACROCYTES BLD QL SMEAR: (no result)
MCH RBC QN AUTO: 33 PG (ref 27–31)
MCHC RBC AUTO-ENTMCNC: 33 G/DL (ref 33–37)
MCV RBC AUTO: 101 FL (ref 78–100)
MONOCYTES NFR BLD: 6 % (ref 3–10)
MUCOUS THREADS URNS QL MICRO: PRESENT
NEUTS SEG NFR BLD MANUAL: 83 % (ref 42–75)
NITRITE UR QL STRIP: POSITIVE
PH UR STRIP.AUTO: 5 [PH] (ref 5–8)
PLATELET # BLD AUTO: 89 K/MM3 (ref 130–400)
PMV BLD AUTO: 14.6 FL (ref 7.4–10.4)
POTASSIUM SERPL-SCNC: 3.7 MMOL/L (ref 3.5–5.1)
PROT ?TM UR-MCNC: (no result) MG/DL
PROT SERPL-MCNC: 7.6 G/DL (ref 6.4–8.3)
RBC # BLD AUTO: 4.15 M/MM3 (ref 4.1–5.3)
RBC UR QL AUTO: NEGATIVE
SODIUM SERPL-SCNC: 144 MMOL/L (ref 136–145)
SP GR UR STRIP.AUTO: 1.03 (ref 1–1.03)
UROBILINOGEN UR-MCNC: NORMAL MG/DL
WBC # BLD AUTO: 3.8 K/MM3 (ref 4.8–10.8)
WBC #/AREA URNS HPF: (no result) /HPF (ref 0–3)

## 2023-11-03 ENCOUNTER — HOSPITAL ENCOUNTER (EMERGENCY)
Dept: HOSPITAL 6 - ED | Age: 28
Discharge: HOME | End: 2023-11-03
Payer: COMMERCIAL

## 2023-11-03 VITALS — BODY MASS INDEX: 34.29 KG/M2 | HEIGHT: 70.98 IN | WEIGHT: 244.93 LBS

## 2023-11-03 VITALS — SYSTOLIC BLOOD PRESSURE: 130 MMHG | DIASTOLIC BLOOD PRESSURE: 98 MMHG

## 2023-11-03 DIAGNOSIS — R13.10: Primary | ICD-10-CM

## 2023-11-13 ENCOUNTER — HOSPITAL ENCOUNTER (OUTPATIENT)
Dept: HOSPITAL 6 - PT | Age: 28
LOS: 17 days | Discharge: HOME | End: 2023-11-30
Attending: FAMILY MEDICINE
Payer: COMMERCIAL

## 2023-11-13 DIAGNOSIS — F07.0: Primary | ICD-10-CM
